# Patient Record
Sex: MALE | Race: WHITE | NOT HISPANIC OR LATINO
[De-identification: names, ages, dates, MRNs, and addresses within clinical notes are randomized per-mention and may not be internally consistent; named-entity substitution may affect disease eponyms.]

---

## 2017-04-10 ENCOUNTER — CHARTING TRANS (OUTPATIENT)
Dept: OTHER | Age: 47
End: 2017-04-10

## 2017-04-10 ENCOUNTER — LAB SERVICES (OUTPATIENT)
Dept: OTHER | Age: 47
End: 2017-04-10

## 2017-04-11 ENCOUNTER — CHARTING TRANS (OUTPATIENT)
Dept: OTHER | Age: 47
End: 2017-04-11

## 2017-04-11 LAB
CREATININE RANDOM URINE: 63.6 MG/DL
MICROALBUMIN UR-MCNC: <0.5 MG/DL
MICROALBUMIN/CREAT UR: NORMAL MCG/MG

## 2017-12-20 ENCOUNTER — CHARTING TRANS (OUTPATIENT)
Dept: OTHER | Age: 47
End: 2017-12-20

## 2018-07-09 ENCOUNTER — CHARTING TRANS (OUTPATIENT)
Dept: OTHER | Age: 48
End: 2018-07-09

## 2018-10-31 VITALS
HEART RATE: 71 BPM | DIASTOLIC BLOOD PRESSURE: 78 MMHG | WEIGHT: 312 LBS | TEMPERATURE: 96.8 F | SYSTOLIC BLOOD PRESSURE: 151 MMHG | BODY MASS INDEX: 41.35 KG/M2 | HEIGHT: 73 IN

## 2018-11-02 VITALS
SYSTOLIC BLOOD PRESSURE: 123 MMHG | HEIGHT: 73 IN | BODY MASS INDEX: 40.69 KG/M2 | HEART RATE: 100 BPM | TEMPERATURE: 97.2 F | DIASTOLIC BLOOD PRESSURE: 76 MMHG | WEIGHT: 307 LBS

## 2018-11-04 VITALS
SYSTOLIC BLOOD PRESSURE: 136 MMHG | HEIGHT: 73 IN | DIASTOLIC BLOOD PRESSURE: 81 MMHG | BODY MASS INDEX: 39.23 KG/M2 | HEART RATE: 82 BPM | TEMPERATURE: 96.7 F | WEIGHT: 296 LBS

## 2018-12-02 DIAGNOSIS — E78.00 HYPERCHOLESTEREMIA: Primary | ICD-10-CM

## 2018-12-04 RX ORDER — ASPIRIN 81 MG/1
TABLET ORAL DAILY
COMMUNITY

## 2018-12-04 RX ORDER — SYRINGE-NEEDLE,INSULIN,0.5 ML 27GX1/2"
SYRINGE, EMPTY DISPOSABLE MISCELLANEOUS
COMMUNITY
Start: 2018-06-24 | End: 2019-06-24

## 2018-12-04 RX ORDER — ATORVASTATIN CALCIUM 10 MG/1
TABLET, FILM COATED ORAL
COMMUNITY
Start: 2018-07-13 | End: 2019-01-14 | Stop reason: SDUPTHER

## 2018-12-04 RX ORDER — ATORVASTATIN CALCIUM 10 MG/1
TABLET, FILM COATED ORAL
Qty: 90 TABLET | Refills: 0 | Status: SHIPPED | OUTPATIENT
Start: 2018-12-04 | End: 2019-03-02 | Stop reason: SDUPTHER

## 2018-12-04 RX ORDER — INSULIN ASPART 100 [IU]/ML
INJECTION, SOLUTION INTRAVENOUS; SUBCUTANEOUS
COMMUNITY
Start: 2018-06-13 | End: 2019-06-13

## 2018-12-17 ENCOUNTER — TELEPHONE (OUTPATIENT)
Dept: ENDOCRINOLOGY | Age: 48
End: 2018-12-17

## 2018-12-17 RX ORDER — LOSARTAN POTASSIUM AND HYDROCHLOROTHIAZIDE 25; 100 MG/1; MG/1
1 TABLET ORAL DAILY
COMMUNITY
End: 2018-12-17 | Stop reason: SDUPTHER

## 2018-12-17 RX ORDER — LOSARTAN POTASSIUM AND HYDROCHLOROTHIAZIDE 25; 100 MG/1; MG/1
1 TABLET ORAL DAILY
Qty: 90 TABLET | Refills: 1 | Status: SHIPPED | OUTPATIENT
Start: 2018-12-17 | End: 2019-06-18 | Stop reason: SDUPTHER

## 2019-01-01 ENCOUNTER — EXTERNAL RECORD (OUTPATIENT)
Dept: HEALTH INFORMATION MANAGEMENT | Facility: OTHER | Age: 49
End: 2019-01-01

## 2019-01-07 RX ORDER — PANTOPRAZOLE SODIUM 40 MG/1
TABLET, DELAYED RELEASE ORAL
COMMUNITY

## 2019-01-14 ENCOUNTER — OFFICE VISIT (OUTPATIENT)
Dept: ENDOCRINOLOGY | Age: 49
End: 2019-01-14

## 2019-01-14 ENCOUNTER — LAB SERVICES (OUTPATIENT)
Dept: LAB | Age: 49
End: 2019-01-14

## 2019-01-14 VITALS
SYSTOLIC BLOOD PRESSURE: 121 MMHG | TEMPERATURE: 97.6 F | HEIGHT: 73 IN | DIASTOLIC BLOOD PRESSURE: 83 MMHG | HEART RATE: 82 BPM | BODY MASS INDEX: 41.75 KG/M2 | WEIGHT: 315 LBS

## 2019-01-14 DIAGNOSIS — E10.65 UNCONTROLLED TYPE 1 DIABETES MELLITUS WITH HYPERGLYCEMIA (CMD): Primary | ICD-10-CM

## 2019-01-14 DIAGNOSIS — E10.65 UNCONTROLLED TYPE 1 DIABETES MELLITUS WITH HYPERGLYCEMIA (CMD): ICD-10-CM

## 2019-01-14 DIAGNOSIS — I10 ESSENTIAL HYPERTENSION: ICD-10-CM

## 2019-01-14 DIAGNOSIS — E78.00 HYPERCHOLESTEROLEMIA: ICD-10-CM

## 2019-01-14 LAB
ALBUMIN SERPL-MCNC: 3.9 G/DL (ref 3.6–5.1)
ALBUMIN/GLOB SERPL: 1.2 {RATIO} (ref 1–2.4)
ALP SERPL-CCNC: 110 UNITS/L (ref 45–117)
ALT SERPL-CCNC: 37 UNITS/L
ANION GAP SERPL CALC-SCNC: 13 MMOL/L (ref 10–20)
AST SERPL-CCNC: 23 UNITS/L
BILIRUB SERPL-MCNC: 0.6 MG/DL (ref 0.2–1)
BUN SERPL-MCNC: 15 MG/DL (ref 6–20)
BUN/CREAT SERPL: 18 (ref 7–25)
CALCIUM SERPL-MCNC: 9.4 MG/DL (ref 8.4–10.2)
CHLORIDE SERPL-SCNC: 106 MMOL/L (ref 98–107)
CHOLEST SERPL-MCNC: 162 MG/DL
CHOLEST/HDLC SERPL: 2.9 {RATIO}
CO2 SERPL-SCNC: 27 MMOL/L (ref 21–32)
CREAT SERPL-MCNC: 0.85 MG/DL (ref 0.67–1.17)
CREAT UR-MCNC: 116 MG/DL
FASTING STATUS PATIENT QL REPORTED: ABNORMAL HRS
GLOBULIN SER-MCNC: 3.2 G/DL (ref 2–4)
GLUCOSE SERPL-MCNC: 133 MG/DL (ref 65–99)
HBA1C MFR BLD: 7.6 % (ref 4.5–5.6)
HDLC SERPL-MCNC: 56 MG/DL
LDLC SERPL-MCNC: 89 MG/DL
LENGTH OF FAST TIME PATIENT: NORMAL HRS
MICROALBUMIN UR-MCNC: 0.96 MG/DL
MICROALBUMIN/CREAT UR: 8.3 MG/G
NONHDLC SERPL-MCNC: 106 MG/DL
POTASSIUM SERPL-SCNC: 3.9 MMOL/L (ref 3.4–5.1)
PROT SERPL-MCNC: 7.1 G/DL (ref 6.4–8.2)
SODIUM SERPL-SCNC: 142 MMOL/L (ref 135–145)
TRIGL SERPL-MCNC: 84 MG/DL

## 2019-01-14 PROCEDURE — 83036 HEMOGLOBIN GLYCOSYLATED A1C: CPT | Performed by: INTERNAL MEDICINE

## 2019-01-14 PROCEDURE — 3074F SYST BP LT 130 MM HG: CPT | Performed by: INTERNAL MEDICINE

## 2019-01-14 PROCEDURE — 82043 UR ALBUMIN QUANTITATIVE: CPT | Performed by: INTERNAL MEDICINE

## 2019-01-14 PROCEDURE — 99214 OFFICE O/P EST MOD 30 MIN: CPT | Performed by: INTERNAL MEDICINE

## 2019-01-14 PROCEDURE — 36415 COLL VENOUS BLD VENIPUNCTURE: CPT | Performed by: INTERNAL MEDICINE

## 2019-01-14 PROCEDURE — 80061 LIPID PANEL: CPT | Performed by: INTERNAL MEDICINE

## 2019-01-14 PROCEDURE — 3079F DIAST BP 80-89 MM HG: CPT | Performed by: INTERNAL MEDICINE

## 2019-01-14 PROCEDURE — 80053 COMPREHEN METABOLIC PANEL: CPT | Performed by: INTERNAL MEDICINE

## 2019-01-14 RX ORDER — BLOOD-GLUCOSE METER
1 EACH MISCELLANEOUS ONCE
Qty: 1 KIT | Refills: 0 | Status: SHIPPED | OUTPATIENT
Start: 2019-01-14 | End: 2019-01-14

## 2019-01-14 RX ORDER — LANCETS
EACH MISCELLANEOUS
Qty: 500 EACH | Refills: 3 | Status: SHIPPED | OUTPATIENT
Start: 2019-01-14 | End: 2020-01-14

## 2019-01-14 RX ORDER — ATORVASTATIN CALCIUM 10 MG/1
TABLET, FILM COATED ORAL
COMMUNITY
Start: 2018-09-08 | End: 2019-01-14 | Stop reason: SDUPTHER

## 2019-01-14 ASSESSMENT — PATIENT HEALTH QUESTIONNAIRE - PHQ9
SUM OF ALL RESPONSES TO PHQ9 QUESTIONS 1 AND 2: 0
2. FEELING DOWN, DEPRESSED OR HOPELESS: NOT AT ALL
1. LITTLE INTEREST OR PLEASURE IN DOING THINGS: NOT AT ALL

## 2019-03-02 DIAGNOSIS — E78.00 HYPERCHOLESTEREMIA: ICD-10-CM

## 2019-03-04 RX ORDER — ATORVASTATIN CALCIUM 10 MG/1
TABLET, FILM COATED ORAL
Qty: 90 TABLET | Refills: 1 | Status: SHIPPED | OUTPATIENT
Start: 2019-03-04 | End: 2019-07-15 | Stop reason: DRUGHIGH

## 2019-06-04 DIAGNOSIS — E78.00 HYPERCHOLESTEREMIA: ICD-10-CM

## 2019-06-04 RX ORDER — ATORVASTATIN CALCIUM 10 MG/1
TABLET, FILM COATED ORAL
Qty: 90 TABLET | Refills: 0 | OUTPATIENT
Start: 2019-06-04

## 2019-06-18 RX ORDER — LOSARTAN POTASSIUM AND HYDROCHLOROTHIAZIDE 25; 100 MG/1; MG/1
1 TABLET ORAL DAILY
Qty: 90 TABLET | Refills: 3 | Status: SHIPPED | OUTPATIENT
Start: 2019-06-18 | End: 2019-06-25

## 2019-06-25 ENCOUNTER — TELEPHONE (OUTPATIENT)
Dept: SCHEDULING | Age: 49
End: 2019-06-25

## 2019-06-25 DIAGNOSIS — E10.65 UNCONTROLLED TYPE 1 DIABETES MELLITUS WITH HYPERGLYCEMIA (CMD): Primary | ICD-10-CM

## 2019-06-25 DIAGNOSIS — I10 ESSENTIAL HYPERTENSION: Primary | ICD-10-CM

## 2019-06-25 RX ORDER — HYDROCHLOROTHIAZIDE 25 MG/1
25 TABLET ORAL DAILY
Qty: 90 TABLET | Refills: 3 | Status: SHIPPED | OUTPATIENT
Start: 2019-06-25

## 2019-06-25 RX ORDER — LOSARTAN POTASSIUM 100 MG/1
100 TABLET ORAL DAILY
Qty: 90 TABLET | Refills: 3 | Status: SHIPPED | OUTPATIENT
Start: 2019-06-25

## 2019-06-28 ENCOUNTER — TELEPHONE (OUTPATIENT)
Dept: SCHEDULING | Age: 49
End: 2019-06-28

## 2019-07-08 DIAGNOSIS — E10.65 UNCONTROLLED TYPE 1 DIABETES MELLITUS WITH HYPERGLYCEMIA (CMD): ICD-10-CM

## 2019-07-08 RX ORDER — INSULIN ASPART 100 [IU]/ML
INJECTION, SOLUTION INTRAVENOUS; SUBCUTANEOUS
Qty: 90 ML | Refills: 3 | Status: SHIPPED | OUTPATIENT
Start: 2019-07-08

## 2019-07-10 DIAGNOSIS — E10.65 UNCONTROLLED TYPE 1 DIABETES MELLITUS WITH HYPERGLYCEMIA (CMD): Primary | ICD-10-CM

## 2019-07-10 RX ORDER — INSULIN GLARGINE 100 [IU]/ML
INJECTION, SOLUTION SUBCUTANEOUS
Qty: 45 ML | Refills: 3 | Status: SHIPPED | OUTPATIENT
Start: 2019-07-10

## 2019-07-15 ENCOUNTER — OFFICE VISIT (OUTPATIENT)
Dept: ENDOCRINOLOGY | Age: 49
End: 2019-07-15

## 2019-07-15 VITALS
SYSTOLIC BLOOD PRESSURE: 117 MMHG | BODY MASS INDEX: 41.75 KG/M2 | DIASTOLIC BLOOD PRESSURE: 62 MMHG | HEART RATE: 71 BPM | HEIGHT: 73 IN | TEMPERATURE: 97.6 F | WEIGHT: 315 LBS

## 2019-07-15 DIAGNOSIS — I10 ESSENTIAL HYPERTENSION: ICD-10-CM

## 2019-07-15 DIAGNOSIS — E78.00 HYPERCHOLESTEROLEMIA: ICD-10-CM

## 2019-07-15 DIAGNOSIS — E10.65 UNCONTROLLED TYPE 1 DIABETES MELLITUS WITH HYPERGLYCEMIA (CMD): Primary | ICD-10-CM

## 2019-07-15 PROCEDURE — 99214 OFFICE O/P EST MOD 30 MIN: CPT | Performed by: INTERNAL MEDICINE

## 2019-07-15 PROCEDURE — 3074F SYST BP LT 130 MM HG: CPT | Performed by: INTERNAL MEDICINE

## 2019-07-15 PROCEDURE — 3078F DIAST BP <80 MM HG: CPT | Performed by: INTERNAL MEDICINE

## 2019-07-15 RX ORDER — ATORVASTATIN CALCIUM 20 MG/1
20 TABLET, FILM COATED ORAL DAILY
Qty: 90 TABLET | Refills: 3 | Status: SHIPPED | OUTPATIENT
Start: 2019-07-15

## 2019-09-02 DIAGNOSIS — E78.00 HYPERCHOLESTEREMIA: ICD-10-CM

## 2019-09-04 RX ORDER — ATORVASTATIN CALCIUM 10 MG/1
TABLET, FILM COATED ORAL
Qty: 90 TABLET | Refills: 3 | Status: SHIPPED | OUTPATIENT
Start: 2019-09-04

## 2019-10-07 DIAGNOSIS — E10.65 UNCONTROLLED TYPE 1 DIABETES MELLITUS WITH HYPERGLYCEMIA (CMD): Primary | ICD-10-CM

## 2019-12-26 LAB — GLUCOSE BLDC GLUCOMTR-MCNC: 270 MG/DL (ref 70–99)

## 2019-12-30 LAB — PATHOLOGIST NAME: NORMAL

## 2020-01-01 ENCOUNTER — EXTERNAL RECORD (OUTPATIENT)
Dept: HEALTH INFORMATION MANAGEMENT | Facility: OTHER | Age: 50
End: 2020-01-01

## 2020-01-16 ENCOUNTER — TELEPHONE (OUTPATIENT)
Dept: SCHEDULING | Age: 50
End: 2020-01-16

## 2020-06-05 LAB — RETINOPATHY PRESENT (RTP): YES

## 2021-02-02 DIAGNOSIS — E10.65 UNCONTROLLED TYPE 1 DIABETES MELLITUS WITH HYPERGLYCEMIA (CMD): ICD-10-CM

## 2021-06-29 ENCOUNTER — EXTERNAL RECORD (OUTPATIENT)
Dept: HEALTH INFORMATION MANAGEMENT | Facility: OTHER | Age: 51
End: 2021-06-29

## 2021-06-29 LAB — RETINOPATHY PRESENT (RTP): YES

## 2022-01-25 ENCOUNTER — EXTERNAL RECORD (OUTPATIENT)
Dept: HEALTH INFORMATION MANAGEMENT | Facility: OTHER | Age: 52
End: 2022-01-25

## 2024-01-23 ENCOUNTER — TELEPHONE (OUTPATIENT)
Dept: ORTHOPEDICS CLINIC | Facility: CLINIC | Age: 54
End: 2024-01-23

## 2024-01-23 DIAGNOSIS — M25.571 RIGHT ANKLE PAIN, UNSPECIFIED CHRONICITY: Primary | ICD-10-CM

## 2024-01-23 NOTE — TELEPHONE ENCOUNTER
Pt coming in for a rt achilles rupture. No imaging in epic. Please advise if xrays are needed.     Thanks!     Future Appointments   Date Time Provider Department Center   1/25/2024 10:40 AM [facility] Little [id number]

## 2024-01-25 ENCOUNTER — HOSPITAL ENCOUNTER (OUTPATIENT)
Dept: GENERAL RADIOLOGY | Age: 54
Discharge: HOME | End: 2024-01-25
Attending: ORTHOPAEDIC SURGERY
Payer: COMMERCIAL

## 2024-01-25 ENCOUNTER — TELEPHONE (OUTPATIENT)
Dept: ORTHOPEDICS CLINIC | Facility: CLINIC | Age: 54
End: 2024-01-25

## 2024-01-25 ENCOUNTER — OFFICE VISIT (OUTPATIENT)
Dept: ORTHOPEDICS CLINIC | Facility: CLINIC | Age: 54
End: 2024-01-25
Payer: COMMERCIAL

## 2024-01-25 VITALS — HEIGHT: 73 IN | BODY MASS INDEX: 41.75 KG/M2 | WEIGHT: 315 LBS

## 2024-01-25 DIAGNOSIS — M25.571 RIGHT ANKLE PAIN, UNSPECIFIED CHRONICITY: ICD-10-CM

## 2024-01-25 DIAGNOSIS — S86.011A RUPTURE OF RIGHT ACHILLES TENDON, INITIAL ENCOUNTER: Primary | ICD-10-CM

## 2024-01-25 PROCEDURE — 99205 OFFICE O/P NEW HI 60 MIN: CPT | Performed by: ORTHOPAEDIC SURGERY

## 2024-01-25 PROCEDURE — 73610 X-RAY EXAM OF ANKLE: CPT | Performed by: ORTHOPAEDIC SURGERY

## 2024-01-25 PROCEDURE — 3008F BODY MASS INDEX DOCD: CPT | Performed by: ORTHOPAEDIC SURGERY

## 2024-01-25 RX ORDER — FAMOTIDINE 40 MG/1
40 TABLET, FILM COATED ORAL DAILY
COMMUNITY

## 2024-01-25 RX ORDER — AZELASTINE 1 MG/ML
1 SPRAY, METERED NASAL AS NEEDED
COMMUNITY

## 2024-01-25 RX ORDER — ROSUVASTATIN CALCIUM 20 MG/1
20 TABLET, COATED ORAL NIGHTLY
COMMUNITY

## 2024-01-25 RX ORDER — OMEPRAZOLE 20 MG/1
40 CAPSULE, DELAYED RELEASE ORAL
COMMUNITY

## 2024-01-25 RX ORDER — DICLOFENAC SODIUM 75 MG/1
75 TABLET, DELAYED RELEASE ORAL AS NEEDED
COMMUNITY

## 2024-01-25 NOTE — PROGRESS NOTES
[facility] New Patient Note    Chief Complaint   Patient presents with    Ankle Injury     RT ACHILLES RUPTURE; DOI: 01/22/2024       HPI: The patient is a 53 year old male with history of type 1 diabetes who presents with complaints of acute posterior right ankle pain and swelling.  He was ascending the stairs on 1/22/2024 when he felt a pop at the posterior aspect of his right ankle.  There were some preceding symptoms of Achilles tendinitis treated conservatively last fall with resolution of symptoms.  He presented to a local urgent care where he was diagnosed with an Achilles rupture and provided a cam boot.  He has been weightbearing as tolerated in the boot.  He denies any underlying neuropathy from his history of diabetes.  Pain and swelling are slowly improving.      History reviewed. No pertinent past medical history.  History reviewed. No pertinent surgical history.  Current Outpatient Medications   Medication Sig Dispense Refill    rosuvastatin 20 MG Oral Tab Take 1 tablet (20 mg total) by mouth nightly.      famotidine 40 MG Oral Tab Take 1 tablet (40 mg total) by mouth daily.      omeprazole 20 MG Oral Capsule Delayed Release Take 2 capsules (40 mg total) by mouth every morning before breakfast. EVERY DAY BEFORE DINNER      azelastine 0.1 % Nasal Solution 1 spray by Nasal route as needed for Rhinitis.      diclofenac 75 MG Oral Tab EC Take 1 tablet (75 mg total) by mouth as needed (NOT MORE THAN ONCE EVERY 12 HOURS).      atorvastatin 20 MG Oral Tab TK 1 T PO D  3    losartan 100 MG Oral Tab TK 1 T PO D  2    Pantoprazole Sodium 40 MG Oral Tab EC TAKE 1 TABLET DAILY      Insulin Aspart (NOVOLOG SC) Inject into the skin.      hydrochlorothiazide 25 MG Oral Tab Take 1 tablet (25 mg total) by mouth daily.      VITAMIN E OR Take by mouth.      B Complex Vitamins (VITAMIN B COMPLEX OR) Inject as directed.      Ascorbic Acid (PETE-C OR) Take by mouth.      VITAMIN E OR       BASAGLAR KWIKPEN 100  UNIT/ML Subcutaneous Solution Pen-injector INJECT 38 UNITS SQ QHS (Patient not taking: Reported on 1/25/2024)  2    Losartan Potassium-HCTZ 100-25 MG Oral Tab  (Patient not taking: Reported on 1/25/2024)       Not on File  History reviewed. No pertinent family history.  Social History     Occupational History    Not on file   Tobacco Use    Smoking status: Never    Smokeless tobacco: Never   Vaping Use    Vaping Use: Never used   Substance and Sexual Activity    Alcohol use: Never    Drug use: Never    Sexual activity: Not on file        ROS:  Complete ROS reviewed by me and non-contributory to the chief complaint except as mentioned above.    Physical Exam:    Ht 6' 1\" (1.854 m)   Wt (!) 348 lb (157.9 kg)   BMI 45.91 kg/m²   Constitutional: Well developed, well nourished 53 year old male  Psychological: NAD, alert and appropriate  Respiratory: Breathing comfortably on room air with RR of 10-14  Cardiac: Palpable distal pulses with pink warm extremities  Cam boot is removed from the right foot and ankle.  There is soft tissue swelling and developing ecchymosis posteriorly at the ankle.  Isaacs test is positive for an Achilles rupture and there may be a palpable defect 3 to 4 cm from the tuberosity insertion.  Medial calf is mildly tender.  He can actively dorsiflex to neutral and plantar flex 10 to 15 degrees.  Neurovascular status is intact on sensory, motor and perfusion assessment distally.    Imaging: Multiple views right ankle personally viewed, demonstrating no fracture dislocation or bony abnormality.  XR ANKLE (MIN 3 VIEWS), RIGHT (CPT=73610)    Result Date: 1/25/2024  CONCLUSION:  See above.   LOCATION:  [place]   Dictated by (CST): Dylan Portillo MD on 1/25/2024 at 10:54 AM     Finalized by (CST): Dylna Portillo MD on 1/25/2024 at 10:54 AM          Assessment/Diagnoses:  Diagnoses and all orders for this visit:    Rupture of right Achilles tendon, initial encounter  -     MRI ANKLE, RIGHT (CPT=73721); Future  -      DME - External      Plan:  I reviewed imaging and exam findings with the patient.  Presents with a history and exam consistent with an acute Achilles tendon rupture.  In light of his medical comorbidities including morbid obesity, type 1 diabetes and history of preceding Achilles symptoms, I would like to obtain an MRI to further characterize the nature of his rupture.  It is possible it may be avulsed from the calcaneus versus mid substance.  The quality of the tendon will also be better assessed.  We touched upon the diagnosis and his treatment options which includes conservative management with casting versus operative repair.  Given his relative youth and activity level he is leaning toward repair in hopes of minimizing rerupture rates and a normalizing function and strength.  For now, we will order a stat MRI of the right ankle for preoperative evaluation and continue to mobilize him in the cam boot with a supplemental heel wedge.  I also advised that he use crutches for protected weightbearing and balance.  We touched upon the nature of Achilles repair which will require general anesthesia.  He has undergone surgical management of hand issues in the past.  Nonetheless with his medical comorbidities there is always a elevated risk of infection and a type I diabetic.  Risk, benefits and alternatives to surgery include but are not limited to possible infection, bleeding, neurovascular injury as well as postop stiffness, rupture, continued pain and failed improvement following surgery.  Risks of anesthesia include but are not limited to possible cardiac, pulmonary, or cerebrovascular complications, any of which could be life-threatening.  Although he is at elevated risk due to his medical comorbidities, he states that he has tolerated anesthesia in the past without complication.  Pre-operative medical clearance for anesthesia will be required.  If he elects to proceed with surgical repair, the procedure will  include a right Achilles tendon repair under anesthesia.  The patient verbalized understanding and for now, we will order a stat MRI for presurgical evaluation of the zone of injury, provide a heel wedge to protect the Achilles within the boot as well as crutches for assisted with gait and for fall prevention.  We will be in touch once the MRI becomes available.    Ester Little MD  [facility]      This document was partially prepared using Dragon Medical voice recognition software.

## 2024-01-25 NOTE — TELEPHONE ENCOUNTER
Called and spoke with Jayy in regards to getting him scheduled for surgery. I did let him know that I did speak with Dr. Little to let him know that he was able to get in for an appt with his pcp on 1/30/24. I also informed him that Dr. Little would like for him to proceed with surgery on 2/2/24. He has opted to proceed with surgery on this day. I did confirm that his surgery will take place at [facility]. I also discussed the surgical protocol with him as well as the post op appt date and time. He states understanding with no questions or concerns.

## 2024-01-25 NOTE — TELEPHONE ENCOUNTER
Date of Surgery: 2/2/24        Post Op Appt:  2/15/24 @ 0140    Case ID: [id number]    Notes:       SURGERY SCHEDULING SHEET     Jayy Jalloh   6/6/1970   [id number]     Procedure: Right Achilles Tendon Repair ([id number])     Diagnosis: Right Achilles tendon rupture     Anesthesia: General     Length of Surgery: 1.5 hrs     Disposition: Outpatient     Special Equipment: Arthrex, Michael Frame     Positioning:  Prone     Assist: Yes SK     Pre-op Testing: PER ANESTHESIA GUIDELINES     Clearance: HISTORY AND PHYSICAL     Post op: 2 weeks post op     Ester Little MD   [facility]   Phone: [phone]   Fax: [phone]

## 2024-01-26 ENCOUNTER — HOSPITAL ENCOUNTER (OUTPATIENT)
Dept: MRI IMAGING | Facility: HOSPITAL | Age: 54
Discharge: HOME | End: 2024-01-26
Attending: ORTHOPAEDIC SURGERY
Payer: COMMERCIAL

## 2024-01-26 DIAGNOSIS — S86.011A RUPTURE OF RIGHT ACHILLES TENDON, INITIAL ENCOUNTER: ICD-10-CM

## 2024-01-26 PROCEDURE — 73721 MRI JNT OF LWR EXTRE W/O DYE: CPT | Performed by: ORTHOPAEDIC SURGERY

## 2024-01-26 RX ORDER — MONTELUKAST SODIUM 10 MG/1
10 TABLET ORAL NIGHTLY
COMMUNITY

## 2024-01-26 RX ORDER — ASPIRIN 81 MG/1
81 TABLET ORAL DAILY
COMMUNITY

## 2024-02-02 ENCOUNTER — HOSPITAL ENCOUNTER (OUTPATIENT)
Facility: HOSPITAL | Age: 54
Setting detail: HOSPITAL OUTPATIENT SURGERY
Discharge: HOME | End: 2024-02-02
Attending: ORTHOPAEDIC SURGERY | Admitting: ORTHOPAEDIC SURGERY
Payer: COMMERCIAL

## 2024-02-02 ENCOUNTER — ANESTHESIA EVENT (OUTPATIENT)
Dept: SURGERY | Facility: HOSPITAL | Age: 54
End: 2024-02-02
Payer: COMMERCIAL

## 2024-02-02 ENCOUNTER — ANESTHESIA (OUTPATIENT)
Dept: SURGERY | Facility: HOSPITAL | Age: 54
End: 2024-02-02
Payer: COMMERCIAL

## 2024-02-02 VITALS
HEART RATE: 72 BPM | HEIGHT: 73 IN | TEMPERATURE: 98 F | RESPIRATION RATE: 18 BRPM | WEIGHT: 315 LBS | OXYGEN SATURATION: 100 % | DIASTOLIC BLOOD PRESSURE: 59 MMHG | SYSTOLIC BLOOD PRESSURE: 143 MMHG | BODY MASS INDEX: 41.75 KG/M2

## 2024-02-02 DIAGNOSIS — S86.011D RUPTURE OF RIGHT ACHILLES TENDON, SUBSEQUENT ENCOUNTER: Primary | ICD-10-CM

## 2024-02-02 LAB
GLUCOSE BLD-MCNC: 113 MG/DL (ref 70–99)
GLUCOSE BLD-MCNC: 242 MG/DL (ref 70–99)

## 2024-02-02 PROCEDURE — 82962 GLUCOSE BLOOD TEST: CPT

## 2024-02-02 PROCEDURE — 0LQN0ZZ REPAIR RIGHT LOWER LEG TENDON, OPEN APPROACH: ICD-10-PCS | Performed by: ORTHOPAEDIC SURGERY

## 2024-02-02 RX ORDER — NICOTINE POLACRILEX 4 MG
30 LOZENGE BUCCAL
Status: DISCONTINUED | OUTPATIENT
Start: 2024-02-02 | End: 2024-02-02 | Stop reason: HOSPADM

## 2024-02-02 RX ORDER — LABETALOL HYDROCHLORIDE 5 MG/ML
5 INJECTION, SOLUTION INTRAVENOUS EVERY 5 MIN PRN
Status: DISCONTINUED | OUTPATIENT
Start: 2024-02-02 | End: 2024-02-02

## 2024-02-02 RX ORDER — ACETAMINOPHEN 500 MG
1000 TABLET ORAL ONCE
Status: DISCONTINUED | OUTPATIENT
Start: 2024-02-02 | End: 2024-02-02 | Stop reason: HOSPADM

## 2024-02-02 RX ORDER — HYDROMORPHONE HYDROCHLORIDE 1 MG/ML
0.6 INJECTION, SOLUTION INTRAMUSCULAR; INTRAVENOUS; SUBCUTANEOUS EVERY 5 MIN PRN
Status: DISCONTINUED | OUTPATIENT
Start: 2024-02-02 | End: 2024-02-02

## 2024-02-02 RX ORDER — HYDROCODONE BITARTRATE AND ACETAMINOPHEN 5; 325 MG/1; MG/1
1-2 TABLET ORAL EVERY 6 HOURS PRN
Qty: 24 TABLET | Refills: 0 | Status: SHIPPED | OUTPATIENT
Start: 2024-02-02

## 2024-02-02 RX ORDER — BUPIVACAINE HYDROCHLORIDE 5 MG/ML
INJECTION, SOLUTION EPIDURAL; INTRACAUDAL AS NEEDED
Status: DISCONTINUED | OUTPATIENT
Start: 2024-02-02 | End: 2024-02-02 | Stop reason: HOSPADM

## 2024-02-02 RX ORDER — ROCURONIUM BROMIDE 10 MG/ML
INJECTION, SOLUTION INTRAVENOUS AS NEEDED
Status: DISCONTINUED | OUTPATIENT
Start: 2024-02-02 | End: 2024-02-02 | Stop reason: SURG

## 2024-02-02 RX ORDER — NICOTINE POLACRILEX 4 MG
15 LOZENGE BUCCAL
Status: DISCONTINUED | OUTPATIENT
Start: 2024-02-02 | End: 2024-02-02 | Stop reason: HOSPADM

## 2024-02-02 RX ORDER — HYDROCODONE BITARTRATE AND ACETAMINOPHEN 5; 325 MG/1; MG/1
1 TABLET ORAL EVERY 6 HOURS PRN
Status: CANCELLED | OUTPATIENT
Start: 2024-02-02

## 2024-02-02 RX ORDER — LIDOCAINE HYDROCHLORIDE 10 MG/ML
INJECTION, SOLUTION INFILTRATION; PERINEURAL AS NEEDED
Status: DISCONTINUED | OUTPATIENT
Start: 2024-02-02 | End: 2024-02-02 | Stop reason: HOSPADM

## 2024-02-02 RX ORDER — DEXTROSE MONOHYDRATE 25 G/50ML
50 INJECTION, SOLUTION INTRAVENOUS
Status: DISCONTINUED | OUTPATIENT
Start: 2024-02-02 | End: 2024-02-02 | Stop reason: HOSPADM

## 2024-02-02 RX ORDER — HYDROMORPHONE HYDROCHLORIDE 1 MG/ML
0.4 INJECTION, SOLUTION INTRAMUSCULAR; INTRAVENOUS; SUBCUTANEOUS EVERY 5 MIN PRN
Status: DISCONTINUED | OUTPATIENT
Start: 2024-02-02 | End: 2024-02-02

## 2024-02-02 RX ORDER — PROCHLORPERAZINE EDISYLATE 5 MG/ML
5 INJECTION INTRAMUSCULAR; INTRAVENOUS EVERY 8 HOURS PRN
Status: DISCONTINUED | OUTPATIENT
Start: 2024-02-02 | End: 2024-02-02

## 2024-02-02 RX ORDER — HYDROCODONE BITARTRATE AND ACETAMINOPHEN 5; 325 MG/1; MG/1
1 TABLET ORAL EVERY 6 HOURS PRN
Status: DISCONTINUED | OUTPATIENT
Start: 2024-02-02 | End: 2024-02-02

## 2024-02-02 RX ORDER — CEFAZOLIN SODIUM IN 0.9 % NACL 3 G/100 ML
3 INTRAVENOUS SOLUTION, PIGGYBACK (ML) INTRAVENOUS ONCE
Status: COMPLETED | OUTPATIENT
Start: 2024-02-02 | End: 2024-02-02

## 2024-02-02 RX ORDER — ONDANSETRON 2 MG/ML
INJECTION INTRAMUSCULAR; INTRAVENOUS AS NEEDED
Status: DISCONTINUED | OUTPATIENT
Start: 2024-02-02 | End: 2024-02-02 | Stop reason: SURG

## 2024-02-02 RX ORDER — KETOROLAC TROMETHAMINE 30 MG/ML
INJECTION, SOLUTION INTRAMUSCULAR; INTRAVENOUS AS NEEDED
Status: DISCONTINUED | OUTPATIENT
Start: 2024-02-02 | End: 2024-02-02 | Stop reason: SURG

## 2024-02-02 RX ORDER — NALOXONE HYDROCHLORIDE 0.4 MG/ML
80 INJECTION, SOLUTION INTRAMUSCULAR; INTRAVENOUS; SUBCUTANEOUS AS NEEDED
Status: DISCONTINUED | OUTPATIENT
Start: 2024-02-02 | End: 2024-02-02

## 2024-02-02 RX ORDER — ACETAMINOPHEN 325 MG/1
650 TABLET ORAL ONCE
Status: DISCONTINUED | OUTPATIENT
Start: 2024-02-02 | End: 2024-02-02

## 2024-02-02 RX ORDER — INSULIN ASPART 100 [IU]/ML
INJECTION, SOLUTION INTRAVENOUS; SUBCUTANEOUS
Status: COMPLETED
Start: 2024-02-02 | End: 2024-02-02

## 2024-02-02 RX ORDER — MEPERIDINE HYDROCHLORIDE 25 MG/ML
12.5 INJECTION INTRAMUSCULAR; INTRAVENOUS; SUBCUTANEOUS AS NEEDED
Status: DISCONTINUED | OUTPATIENT
Start: 2024-02-02 | End: 2024-02-02

## 2024-02-02 RX ORDER — HYDROMORPHONE HYDROCHLORIDE 1 MG/ML
0.2 INJECTION, SOLUTION INTRAMUSCULAR; INTRAVENOUS; SUBCUTANEOUS EVERY 5 MIN PRN
Status: DISCONTINUED | OUTPATIENT
Start: 2024-02-02 | End: 2024-02-02

## 2024-02-02 RX ORDER — ACETAMINOPHEN 500 MG
1000 TABLET ORAL ONCE AS NEEDED
Status: DISCONTINUED | OUTPATIENT
Start: 2024-02-02 | End: 2024-02-02

## 2024-02-02 RX ORDER — HYDROCODONE BITARTRATE AND ACETAMINOPHEN 5; 325 MG/1; MG/1
2 TABLET ORAL ONCE AS NEEDED
Status: DISCONTINUED | OUTPATIENT
Start: 2024-02-02 | End: 2024-02-02

## 2024-02-02 RX ORDER — SODIUM CHLORIDE, SODIUM LACTATE, POTASSIUM CHLORIDE, CALCIUM CHLORIDE 600; 310; 30; 20 MG/100ML; MG/100ML; MG/100ML; MG/100ML
INJECTION, SOLUTION INTRAVENOUS CONTINUOUS
Status: DISCONTINUED | OUTPATIENT
Start: 2024-02-02 | End: 2024-02-02

## 2024-02-02 RX ORDER — ONDANSETRON 2 MG/ML
4 INJECTION INTRAMUSCULAR; INTRAVENOUS EVERY 6 HOURS PRN
Status: DISCONTINUED | OUTPATIENT
Start: 2024-02-02 | End: 2024-02-02

## 2024-02-02 RX ORDER — LIDOCAINE HYDROCHLORIDE 10 MG/ML
INJECTION, SOLUTION EPIDURAL; INFILTRATION; INTRACAUDAL; PERINEURAL AS NEEDED
Status: DISCONTINUED | OUTPATIENT
Start: 2024-02-02 | End: 2024-02-02 | Stop reason: SURG

## 2024-02-02 RX ORDER — SCOLOPAMINE TRANSDERMAL SYSTEM 1 MG/1
1 PATCH, EXTENDED RELEASE TRANSDERMAL ONCE
Status: DISCONTINUED | OUTPATIENT
Start: 2024-02-02 | End: 2024-02-02 | Stop reason: HOSPADM

## 2024-02-02 RX ORDER — INSULIN ASPART 100 [IU]/ML
INJECTION, SOLUTION INTRAVENOUS; SUBCUTANEOUS ONCE
Status: COMPLETED | OUTPATIENT
Start: 2024-02-02 | End: 2024-02-02

## 2024-02-02 RX ORDER — DIPHENHYDRAMINE HYDROCHLORIDE 50 MG/ML
12.5 INJECTION INTRAMUSCULAR; INTRAVENOUS AS NEEDED
Status: DISCONTINUED | OUTPATIENT
Start: 2024-02-02 | End: 2024-02-02

## 2024-02-02 RX ORDER — HYDROCODONE BITARTRATE AND ACETAMINOPHEN 5; 325 MG/1; MG/1
1 TABLET ORAL ONCE AS NEEDED
Status: DISCONTINUED | OUTPATIENT
Start: 2024-02-02 | End: 2024-02-02

## 2024-02-02 RX ADMIN — ROCURONIUM BROMIDE 15 MG: 10 INJECTION, SOLUTION INTRAVENOUS at 15:52:00

## 2024-02-02 RX ADMIN — ONDANSETRON 4 MG: 2 INJECTION INTRAMUSCULAR; INTRAVENOUS at 16:50:00

## 2024-02-02 RX ADMIN — SODIUM CHLORIDE, SODIUM LACTATE, POTASSIUM CHLORIDE, CALCIUM CHLORIDE: 600; 310; 30; 20 INJECTION, SOLUTION INTRAVENOUS at 17:18:00

## 2024-02-02 RX ADMIN — CEFAZOLIN SODIUM IN 0.9 % NACL 3 G: 3 G/100 ML INTRAVENOUS SOLUTION, PIGGYBACK (ML) INTRAVENOUS at 15:27:00

## 2024-02-02 RX ADMIN — SODIUM CHLORIDE, SODIUM LACTATE, POTASSIUM CHLORIDE, CALCIUM CHLORIDE: 600; 310; 30; 20 INJECTION, SOLUTION INTRAVENOUS at 15:18:00

## 2024-02-02 RX ADMIN — SODIUM CHLORIDE, SODIUM LACTATE, POTASSIUM CHLORIDE, CALCIUM CHLORIDE: 600; 310; 30; 20 INJECTION, SOLUTION INTRAVENOUS at 15:46:00

## 2024-02-02 RX ADMIN — ROCURONIUM BROMIDE 40 MG: 10 INJECTION, SOLUTION INTRAVENOUS at 15:26:00

## 2024-02-02 RX ADMIN — ROCURONIUM BROMIDE 10 MG: 10 INJECTION, SOLUTION INTRAVENOUS at 15:25:00

## 2024-02-02 RX ADMIN — LIDOCAINE HYDROCHLORIDE 100 MG: 10 INJECTION, SOLUTION EPIDURAL; INFILTRATION; INTRACAUDAL; PERINEURAL at 15:25:00

## 2024-02-02 RX ADMIN — KETOROLAC TROMETHAMINE 45 MG: 30 INJECTION, SOLUTION INTRAMUSCULAR; INTRAVENOUS at 17:03:00

## 2024-02-02 NOTE — ANESTHESIA PROCEDURE NOTES
Airway  Date/Time: 2/2/2024 3:25 PM  Urgency: elective      General Information and Staff    Patient location during procedure: OR  Anesthesiologist: Juan J Simon MD  Performed: anesthesiologist   Performed by: Juan J Simon MD  Authorized by: Juan J Simon MD      Indications and Patient Condition  Indications for airway management: anesthesia  Spontaneous Ventilation: absent  Sedation level: deep  Preoxygenated: yes  Patient position: sniffing  Mask difficulty assessment: 0 - not attempted    Final Airway Details  Final airway type: endotracheal airway      Successful airway: ETT  Cuffed: yes   Successful intubation technique: Video laryngoscopy  Facilitating devices/methods: intubating stylet  Endotracheal tube insertion site: oral  Blade: GlideScope  Blade size: #4  ETT size (mm): 7.5    Cormack-Lehane Classification: grade I - full view of glottis  Placement verified by: capnometry   Cuff volume (mL): 11  Measured from: lips  ETT to lips (cm): 23  Number of attempts at approach: 1  Number of other approaches attempted: 0

## 2024-02-02 NOTE — ANESTHESIA POSTPROCEDURE EVALUATION
[facility]    Jayy Jalloh Patient Status:  Hospital Outpatient Surgery   Age/Gender 53 year old male MRN [id number]   Location [facility] POST ANESTHESIA CARE UNIT Attending Ester Little MD   Hosp Day # 0 PCP NYDIA SHEPARD       Anesthesia Post-op Note    RIGHT ACHILLES TENDON REPAIR    Procedure Summary       Date: 02/02/24 Room / Location:  MAIN OR  /  MAIN OR    Anesthesia Start: 1518 Anesthesia Stop: 1728    Procedure: RIGHT ACHILLES TENDON REPAIR (Right: Ankle) Diagnosis:       Rupture of right Achilles tendon, initial encounter      (Rupture of right Achilles tendon, initial encounter [S86.011A])    Surgeons: Ester Little MD Anesthesiologist: Juan J Simon MD    Anesthesia Type: general ASA Status: 2            Anesthesia Type: No value filed.    Vitals Value Taken Time   /58 02/02/24 1733   Temp 96.8 °F (36 °C) 02/02/24 1728   Pulse 68 02/02/24 1736   Resp 20 02/02/24 1736   SpO2 96 % 02/02/24 1736   Vitals shown include unfiled device data.    Patient Location: PACU    Anesthesia Type: general    Airway Patency: patent and extubated    Postop Pain Control: adequate    Mental Status: preanesthetic baseline    Nausea/Vomiting: none    Cardiopulmonary/Hydration status: stable euvolemic    Complications: no apparent anesthesia related complications    Postop vital signs: stable    Dental Exam: Unchanged from Preop    Patient to be discharged from PACU when criteria met.

## 2024-02-02 NOTE — ANESTHESIA PREPROCEDURE EVALUATION
PRE-OP EVALUATION    Patient Name: Jayy Jalloh    Admit Diagnosis: Rupture of right Achilles tendon, initial encounter [S86.011A]    Pre-op Diagnosis: Rupture of right Achilles tendon, initial encounter [S86.011A]    RIGHT ACHILLES TENDON REPAIR    Anesthesia Procedure: RIGHT ACHILLES TENDON REPAIR (Right)    Surgeon(s) and Role:     * Ester Little MD - Primary    Pre-op vitals reviewed.  Temp: 98.6 °F (37 °C)  Pulse: 69  Resp: 16  BP: 139/72  SpO2: 100 %  Body mass index is 46.44 kg/m².    Current medications reviewed.  Hospital Medications:   [MAR Hold] acetaminophen (Tylenol Extra Strength) tab 1,000 mg  1,000 mg Oral Once    [MAR Hold] scopolamine (Transderm-Scop) 1 MG/3DAYS patch 1 patch  1 patch Transdermal Once    [MAR Hold] glucose (Dex4) 15 GM/59ML oral liquid 15 g  15 g Oral Q15 Min PRN    Or    [MAR Hold] glucose (Glutose) 40% oral gel 15 g  15 g Oral Q15 Min PRN    Or    [MAR Hold] glucose-vitamin C (Dex-4) chewable tab 4 tablet  4 tablet Oral Q15 Min PRN    Or    [MAR Hold] dextrose 50% injection 50 mL  50 mL Intravenous Q15 Min PRN    Or    [MAR Hold] glucose (Dex4) 15 GM/59ML oral liquid 30 g  30 g Oral Q15 Min PRN    Or    [MAR Hold] glucose (Glutose) 40% oral gel 30 g  30 g Oral Q15 Min PRN    Or    [MAR Hold] glucose-vitamin C (Dex-4) chewable tab 8 tablet  8 tablet Oral Q15 Min PRN    lactated ringers infusion   Intravenous Continuous    [COMPLETED] ceFAZolin (Ancef) 3 g in sodium chloride 0.9% 100mL IVPB premix  3 g Intravenous Once       Outpatient Medications:     Medications Prior to Admission   Medication Sig Dispense Refill Last Dose    aspirin 81 MG Oral Tab EC Take 1 tablet (81 mg total) by mouth daily.   1/25/2024    rosuvastatin 20 MG Oral Tab Take 1 tablet (20 mg total) by mouth nightly.   2/1/2024 at 2100    famotidine 40 MG Oral Tab Take 1 tablet (40 mg total) by mouth daily.   2/1/2024 at 2100    omeprazole 20 MG Oral Capsule Delayed Release Take 2 capsules (40 mg total) by  mouth every morning before breakfast. EVERY DAY BEFORE DINNER   2/1/2024 at 2100    azelastine 0.1 % Nasal Solution 1 spray by Nasal route as needed for Rhinitis.   2/1/2024 at 2100    atorvastatin 20 MG Oral Tab TK 1 T PO D  3 2/1/2024 at 2100    losartan 100 MG Oral Tab TK 1 T PO D  2 2/1/2024 at 2100    Pantoprazole Sodium 40 MG Oral Tab EC TAKE 1 TABLET DAILY       Insulin Aspart (NOVOLOG SC) Inject into the skin.   2/2/2024 at 1245    hydrochlorothiazide 25 MG Oral Tab Take 1 tablet (25 mg total) by mouth daily.   1/26/2024    VITAMIN E OR Take by mouth.   1/25/2024    B Complex Vitamins (VITAMIN B COMPLEX OR) Inject as directed.   1/26/2024    Ascorbic Acid (PETE-C OR) Take by mouth.   1/25/2024    montelukast 10 MG Oral Tab Take 1 tablet (10 mg total) by mouth nightly.   More than a month    diclofenac 75 MG Oral Tab EC Take 1 tablet (75 mg total) by mouth as needed (NOT MORE THAN ONCE EVERY 12 HOURS).   1/24/2024    BASAGLAR KWIKPEN 100 UNIT/ML Subcutaneous Solution Pen-injector INJECT 38 UNITS SQ QHS (Patient not taking: Reported on 1/25/2024)  2     Losartan Potassium-HCTZ 100-25 MG Oral Tab  (Patient not taking: Reported on 1/25/2024)       VITAMIN E OR    1/25/2024       Allergies: Patient has no known allergies.      Anesthesia Evaluation    Patient summary reviewed.    Anesthetic Complications  (-) history of anesthetic complications         GI/Hepatic/Renal      (+) GERD                           Cardiovascular      ECG reviewed.  Exercise tolerance: good     MET: >4    (+) obesity  (+) hypertension       (-) past MI                (-) angina     (-) CASTELLON  (-) orthopnea  (-) PND     Endo/Other      (+) diabetes  type 1, using insulin                         Pulmonary    Negative pulmonary ROS.           (-) shortness of breath  (-) recent URI   (-) sleep apnea       Neuro/Psych    Negative neuro/psych ROS.                                  Past Surgical History:   Procedure Laterality Date     COLONOSCOPY      FRACTURE SURGERY      TONSILLECTOMY      UPPER GI ENDOSCOPY,EXAM       Social History     Socioeconomic History    Marital status:    Tobacco Use    Smoking status: Never    Smokeless tobacco: Never   Vaping Use    Vaping Use: Never used   Substance and Sexual Activity    Alcohol use: Never    Drug use: Never     History   Drug Use Unknown     Available pre-op labs reviewed.               Airway      Mallampati: II  Mouth opening: >3 FB  TM distance: 4 - 6 cm  Neck ROM: limited Cardiovascular    Cardiovascular exam normal.  Rhythm: regular  Rate: normal  (-) murmur   Dental    Dentition appears grossly intact         Pulmonary    Pulmonary exam normal.  Breath sounds clear to auscultation bilaterally.        (-) wheezes       Other findings              ASA: 2   Plan: general  NPO status verified and patient meets guidelines.    Post-procedure pain management plan discussed with surgeon and patient.      Plan/risks discussed with: patient              We discussed GA w/ETT and possible scratchy throat and rarely dental damage.  We discussed analgesic plan and PONV prophylaxis.  We discussed padding in the prone position with optigards, facial foam, and other pressure points padded.  The patient's questions were answered and consent was attained.

## 2024-02-02 NOTE — BRIEF OP NOTE
Pre-Operative Diagnosis: Rupture of right Achilles tendon, initial encounter [S86.011A]     Post-Operative Diagnosis: Rupture of right Achilles tendon, initial encounter [S86.011A]      Procedure Performed:   RIGHT ACHILLES TENDON REPAIR    Surgeon(s) and Role:     * Ester Little MD - Primary    Assistant(s):  Surgical Assistant.: Nahum Mazariegos     Surgical Findings: See post-op     Specimen: none     Estimated Blood Loss: Blood Output: 10 mL (2/2/2024  4:53 PM)    Ester Little MD  2/2/2024  5:23 PM

## 2024-02-02 NOTE — H&P
Orthopaedic H&P Update    H&P performed by Dr. Walter Marcos was reviewed by Ester Little MD on 2/2/2024, the patient was examined and no significant changes have occurred in the patient's condition since the H&P was performed.  Risks and benefits were discussed, proceed with procedure as planned.      Ester Little MD

## 2024-02-03 NOTE — DISCHARGE INSTRUCTIONS
Home Care Instructions  POST-OPERATIVE INSTRUCTIONS  K Little.    1. Rest and minimize your activities.  2. Keep splints, braces, slings, immobilizers and dressings on unless instructed differently.  3. Take pain medication as directed. Start your anti-inflammatory the day after surgery. You must take it after a meal.  4. Keep your surgical dressing dry. The dressing will be removed tomorrow at your post-op appointment.  5. Regularly move your fingers or toes/ankles of the operative extremity. If you have more than mild swelling, call our office [phone].  6. Wear loose fitting clothes if you are scheduled for physical therapy.  7. Please make note of the location of your appointment.

## 2024-02-03 NOTE — OPERATIVE REPORT
[facility]    PATIENT'S NAME: DAKOTA GOMEZ   ATTENDING PHYSICIAN: Ester Little M.D.   OPERATING PHYSICIAN: Ester Little M.D.   PATIENT ACCOUNT#:   [id number]    LOCATION:  PACU  PACU 6 ED 10  MEDICAL RECORD #:   [id number]       YOB: 1970  ADMISSION DATE:       02/02/2024      OPERATION DATE:  02/02/2024    OPERATIVE REPORT      PREOPERATIVE DIAGNOSIS:  Right Achilles tendon rupture.      I POSTOPERATIVE DIAGNOSIS:  Right Achilles tendon rupture.   Although Ash thank you Monday early    PROCEDURE:  Right Achilles tendon repair.    ASSISTANT:  WENDI Plata     ANESTHESIA:  General.    COMPLICATIONS:  None.    DRAINS:  None.      SPECIMENS:  None.      CONDITION:  Stable.    BLOOD LOSS:  10 mL.      TOURNIQUET TIME:  Approximately 80 minutes.    IMPLANTS:  Arthrex 1.3 mm SutureTape x6.    INDICATIONS:  The patient is a 53-year-old male with history of diabetes who sustained an acute injury to his right Achilles on 01/22/2024.  He was ascending the stairs when he felt a pop at the posterior aspect of his right ankle with associated pain and weakness.  He was seen in the office on 01/25/2024 and diagnosed with an acute Achilles tendon rupture.  Treatment recommendations were reviewed.  Given the patient's relative youth, activity level, and a BMI of 47, I suggested that he would be at risk for re-rupture, chronic weakness, and difficulty with push-off strength with nonoperative care.  We, therefore, discussed options for Achilles tendon repair including risks, benefits, and alternatives.  This includes, but is not limited to, possible infection, bleeding, neurovascular injury, postop stiffness, persistent weakness, re-rupture, skin and wound problems, or failed improvement following surgery.  Risks of anesthesia were also reviewed including, but not limited to, possible cardiac, pulmonary, cerebrovascular, or thromboembolic phenomena.  Appropriate preoperative medical  evaluation and clearance was provided through his primary care physician, Dr. Walter Marcos.  Informed consent was obtained.    OPERATIVE TECHNIQUE:  Patient was identified in the preop holding area where the right lower extremity was initialed.  He was taken to the operating room and intubated supine on the transport cart.  We then placed a well-padded proximal thigh tourniquet on the right lower extremity preset to 275 mmHg.  We then carefully placed him in the prone position on a Michael frame with all bony prominences well padded including bilateral axillary and elbow padding.  A sponge face cradle was placed by Anesthesia, as well as protective goggles for the eyes.  Gelfoam pads were placed underneath the knees.  Right lower extremity was then prepped and draped in the usual sterile fashion.    After confirming consent, laterality, prophylactic antibiotics with an appropriate time-out, right lower extremity was exsanguinated with an Esmarch and the tourniquet was elevated.  A 6 cm longitudinal incision was marked, centered over the palpable defect of the right Achilles tendon approximately 3 to 4 cm from the calcaneal tuberosity.  Skin incision was then made through 1 full-thickness flap down to the paratenon.  The underlying paratenon was split in line with the skin incision, exposing a complete rupture in a transverse fashion of the entire Achilles tendon.  The distal stump was mobilized bluntly and initially grasped with a modified Krackow stitch using a 1.3 mm Arthrex SutureTape.  This was placed in the central core of the tendon.  In a similar fashion, the proximal stump was also grasped with a modified Krackow, taking great care to mobilize the myotendinous proximal stump to reapproximate to the distal aspect.  Once adequate core sutures were placed, I selected 2 additional 1.3 mm SutureTapes, and just proximal to the core sutures, placed a locking Jazmín-type transverse grasping suture at the  proximal and distal stumps with the assistance of a long straight Caesar needle.  A second more proximal Jazmín stitch was placed with 2 additional SutureTapes.  This created 6 bridging sutures on each side of the rupture.  In a sequential fashion, working from the most distant sutures from the tear to the core sutures, square knots were tied, initially laterally than medially, for each paired set of sutures.  A total of 6 knots were tied bridging the repair.  I was satisfied with the anatomic reduction of the tear, and the comparison of the resting plantar flexion angle was approximately 25 degrees, similar to the uninjured left side preoperatively.  Isaacs test was restored.  The wound was copiously irrigated and closed in layers.  Meticulous hemostasis was achieved and confirmed with Bovie cautery.  The paratenon was reapproximated with figure-of-eight 3-0 Vicryl working from proximal and distal centrally toward the tear.  We were able to cover the repair completely.  The subdermal layer was reapproximated also with 3-0 Vicryl reapproximating the skin edges to a satisfactory configuration.  I then elected to seal the skin with Dermabond.  A sterile nonadhesive dressing was applied using Adaptic, fluffs, an ABD, and sterile Webril.  He was placed in a well-padded, well-molded short-leg fiberglass cast in about 25 degrees of plantar flexion.  The patient was then flipped supine and awoken without complication.  All sponge, needle, and instrument counts were correct.  He was taken to the postanesthesia recovery room in stable condition.    Dedicated assistance from Nahum Mazariegos [facility], was paramount for retraction, suture management, assistance with wound closure, as well as placement of the dressing and cast.     Dictated By Ester Little M.D.  d: 02/02/2024 17:48:22  t: 02/02/2024 18:19:54  Job [id number]  MARIKA/

## 2024-02-15 ENCOUNTER — OFFICE VISIT (OUTPATIENT)
Dept: ORTHOPEDICS CLINIC | Facility: CLINIC | Age: 54
End: 2024-02-15
Payer: COMMERCIAL

## 2024-02-15 VITALS — BODY MASS INDEX: 41.75 KG/M2 | HEIGHT: 73 IN | WEIGHT: 315 LBS

## 2024-02-15 DIAGNOSIS — Z48.89 AFTERCARE FOLLOWING SURGERY: ICD-10-CM

## 2024-02-15 DIAGNOSIS — S86.011D RUPTURE OF RIGHT ACHILLES TENDON, SUBSEQUENT ENCOUNTER: Primary | ICD-10-CM

## 2024-02-15 NOTE — PROGRESS NOTES
[facility] Post Op Progress Note    Date of Surgery: 02/02/2024      Subjective: Jayy Jalloh is a 53 year old male who is here for follow-up of his right Achilles.  He underwent right Achilles tendon repair by Dr. Little approximately 2 weeks ago.  He has tolerated the short leg cast with no issues.  He does note some abrasions on his left lower extremity due to kicking the left leg with the cast at night.  He states pain is tolerable.  No other complaints.      Objective: Exam of the right lower extremity and foot and ankle after cast removal reveals overlying skin is intact.  The incision is clean, dry, and intact and healing well.  Sensation is present to light touch.  No calf tenderness upon palpation.  He is able to wiggle his digits.      Assessment: Status post right Achilles tendon repair      Plan: Overall he is doing very well.  Due to his history of tendinosis prior to rupture, we do recommend repeat casting for an additional 2 weeks.  This will be another short leg cast to the right lower extremity.  He will follow-up in 2 weeks at which time we will transition him to a cam boot with heel wedges and initiation of physical therapy.  He will bring the boot with him at his next visit.  He understands.  He will follow-up at that time for cast removal.  A work note stating he is unable to return to work due to him walking downtown to work was given.  He will follow-up as mentioned or sooner with questions, concerns, or worsening symptoms.    Procedure: A well-padded, well molded short leg fiberglass cast in plantarflexion to the right lower extremity.  He tolerated the application well and was comfortable after application.  He will follow-up with us with any cast complications.  Cast care was discussed and he will avoid getting the cast wet or putting weight on it.      Zonia Mo, [facility], PA-C Orthopedic Surgery   [facility]  [address] [place] [id number]   t: [phone]   f: [phone]

## 2024-02-19 ENCOUNTER — PATIENT MESSAGE (OUTPATIENT)
Dept: ORTHOPEDICS CLINIC | Facility: CLINIC | Age: 54
End: 2024-02-19

## 2024-02-20 NOTE — TELEPHONE ENCOUNTER
From: Jayy Jalloh  To: Zonia Mo  Sent: 2/19/2024 5:23 PM CST  Subject: Need document filled out.    Dr. Mo  My employer is asking for extra paper work to be filled out. I am attaching the paper work. If you I could forward it to my employer.   If you like I could sent this to Dr. Little but you wrote my other letter, so I went to you first.  If you have any questions feel free to contact me.    Thank you,  Jayy Jalloh

## 2024-02-20 NOTE — TELEPHONE ENCOUNTER
Patient of Dr. Little and Zonia Mo PA-C attached forms through their MyChart that need to be completed. Forms sent to the forms department via email on 02/20/24, as well. Please advise. Thank you!

## 2024-02-26 ENCOUNTER — TELEPHONE (OUTPATIENT)
Dept: ORTHOPEDICS CLINIC | Facility: CLINIC | Age: 54
End: 2024-02-26

## 2024-02-26 NOTE — TELEPHONE ENCOUNTER
Type of Leave: Continuous  Reason for Leave: achilles sx on 2/2/24  Start date of leave: 2/2/24-2/29/24 pending re-eval  How much time needed?:  2 weeks pending re-eval  Forms Due Date:asap  Was Fee and Turnaround info Given?: Yes

## 2024-02-27 NOTE — TELEPHONE ENCOUNTER
Dr. Little,     *The ACKNOWLEDGE button has been moved to the top right ribbon*    Please sign off on form if you agree to: Disab for sx of right achilles tendon repair from 2/2/24 to pending re-eval on 2/29/24.  (place your signature on the first page only)    -From your Inbasket, Highlight the patient and click Chart   -Double click the 2/26/24 Forms Completion telephone encounter  -Scroll down to the Media section   -Click the blue Hyperlink: Jhonatan, Dr. Little, 2/27/24    -Click Acknowledge located in the top right ribbon/menu   -Drag the mouse into the blank space of the document and a + sign will appear. Left click to   electronically sign the document.     Thank you,  Suki MCKEON

## 2024-02-29 ENCOUNTER — PATIENT MESSAGE (OUTPATIENT)
Dept: ORTHOPEDICS CLINIC | Facility: CLINIC | Age: 54
End: 2024-02-29

## 2024-02-29 ENCOUNTER — OFFICE VISIT (OUTPATIENT)
Dept: ORTHOPEDICS CLINIC | Facility: CLINIC | Age: 54
End: 2024-02-29
Payer: COMMERCIAL

## 2024-02-29 VITALS — HEIGHT: 73 IN | WEIGHT: 315 LBS | BODY MASS INDEX: 41.75 KG/M2

## 2024-02-29 DIAGNOSIS — Z48.89 AFTERCARE FOLLOWING SURGERY: Primary | ICD-10-CM

## 2024-02-29 DIAGNOSIS — S86.011D RUPTURE OF RIGHT ACHILLES TENDON, SUBSEQUENT ENCOUNTER: ICD-10-CM

## 2024-02-29 NOTE — PROGRESS NOTES
[facility] Post Op Progress Note    Date of Surgery: 02/02/2024      Subjective: Jayy Jalloh is a 53 year old male who is here for follow-up of his right Achilles.  He underwent right Achilles tendon repair by Dr. Little approximately 4 weeks ago.  He has tolerated recasting in a short leg cast to the right lower extremity with no issues.  He states overall pain is minimal.  No other complaints.      Objective: Exam of the right foot ankle reveals that the incision is well-healed.  He is able to wiggle his digits.  No calf pain upon palpation.  Sensation is present to light touch.      Assessment: Status post right Achilles tendon repair      Plan: He is 4 weeks post Achilles tendon repair.  We did cast him an additional 2 weeks due to his prior tendinosis that caused rupture.  We will initiate formal therapy for him and a PT prescription was given with protocol.  He is able to transition to a cam boot with heel wedges.  He will remain in the cam boot and weight-bear as tolerated with wedges for an additional month.  At week 8 weeks postop he is able to remove 1 heel wedge each week until he is plantigrade by 12 weeks.  He will follow-up with us in 6 weeks to see how he is progressing.  At that time he will have 2 heel wedges left.  At that time we will discuss return to work restrictions.  He does work downtown and has a difficult time with transportation as he is unable to fully weight-bear.  He will follow-up sooner with questions, concerns, or worsening symptoms.    A student was present during the visit after the patient's consent.    Zonia Mo, [facility], PA-C Orthopedic Surgery   [facility]  [address] [place] [id number]   t: [phone]  f: [phone]

## 2024-02-29 NOTE — TELEPHONE ENCOUNTER
From: Jayy Jalloh  To: Zonia Mo  Sent: 2/29/2024 12:55 PM CST  Subject: Question about the boot and sleeping     Dr. Amado's  Do I need to keep the boot o when I an sleeping?  Jayy Jalloh

## 2024-03-21 ENCOUNTER — MED REC SCAN ONLY (OUTPATIENT)
Dept: ORTHOPEDICS CLINIC | Facility: CLINIC | Age: 54
End: 2024-03-21

## 2024-04-11 ENCOUNTER — OFFICE VISIT (OUTPATIENT)
Dept: ORTHOPEDICS CLINIC | Facility: CLINIC | Age: 54
End: 2024-04-11
Payer: COMMERCIAL

## 2024-04-11 VITALS — BODY MASS INDEX: 41.75 KG/M2 | HEIGHT: 73 IN | WEIGHT: 315 LBS

## 2024-04-11 DIAGNOSIS — Z48.89 AFTERCARE FOLLOWING SURGERY: Primary | ICD-10-CM

## 2024-04-11 DIAGNOSIS — S86.011D RUPTURE OF RIGHT ACHILLES TENDON, SUBSEQUENT ENCOUNTER: ICD-10-CM

## 2024-04-11 PROCEDURE — 99024 POSTOP FOLLOW-UP VISIT: CPT | Performed by: ORTHOPAEDIC SURGERY

## 2024-04-11 NOTE — PROGRESS NOTES
[facility] New Patient Note    Chief Complaint   Patient presents with    Post-Op     RT ACHILLES REPAIR; SX: 02/02/24       HPI: The patient is a 53 year old male with history of type 1 diabetes who presents for postop follow-up after Achilles tendon repair on the right.  He tolerates physical therapy and is ambulating in his boot with 2 remaining wedges and a single crutch.  He and his wife relate that [facility] [facility] [place] but has been essentially working from home.  They request a work restriction note to be extended.  No new complications are reported postop.    Past Medical History:    Esophageal reflux    High blood pressure    High cholesterol    Morbid obesity with BMI of 45.0-49.9, adult (HCC)    Type 1 diabetes mellitus (HCC)    Visual impairment     Past Surgical History:   Procedure Laterality Date    Colonoscopy      Fracture surgery      Tonsillectomy      Upper gi endoscopy,exam       Current Outpatient Medications   Medication Sig Dispense Refill    aspirin 81 MG Oral Tab EC Take 1 tablet (81 mg total) by mouth daily.      montelukast 10 MG Oral Tab Take 1 tablet (10 mg total) by mouth nightly.      rosuvastatin 20 MG Oral Tab Take 1 tablet (20 mg total) by mouth nightly.      famotidine 40 MG Oral Tab Take 1 tablet (40 mg total) by mouth daily.      omeprazole 20 MG Oral Capsule Delayed Release Take 2 capsules (40 mg total) by mouth every morning before breakfast. EVERY DAY BEFORE DINNER      azelastine 0.1 % Nasal Solution 1 spray by Nasal route as needed for Rhinitis.      losartan 100 MG Oral Tab TK 1 T PO D  2    BASAGLAR KWIKPEN 100 UNIT/ML Subcutaneous Solution Pen-injector   2    Losartan Potassium-HCTZ 100-25 MG Oral Tab       Pantoprazole Sodium 40 MG Oral Tab EC TAKE 1 TABLET DAILY      Insulin Aspart (NOVOLOG SC) Inject into the skin.      hydrochlorothiazide 25 MG Oral Tab Take 1 tablet (25 mg total) by mouth daily.      VITAMIN E OR Take by  mouth.      B Complex Vitamins (VITAMIN B COMPLEX OR) Inject as directed.      Ascorbic Acid (PETE-C OR) Take by mouth.      VITAMIN E OR       HYDROcodone-acetaminophen 5-325 MG Oral Tab Take 1-2 tablets by mouth every 6 (six) hours as needed for Pain. (Patient not taking: Reported on 4/11/2024) 24 tablet 0    diclofenac 75 MG Oral Tab EC Take 1 tablet (75 mg total) by mouth as needed (NOT MORE THAN ONCE EVERY 12 HOURS). (Patient not taking: Reported on 4/11/2024)       No Known Allergies  History reviewed. No pertinent family history.  Social History     Occupational History    Not on file   Tobacco Use    Smoking status: Never    Smokeless tobacco: Never   Vaping Use    Vaping status: Never Used   Substance and Sexual Activity    Alcohol use: Never    Drug use: Never    Sexual activity: Not on file        ROS:  Complete ROS reviewed by me and non-contributory to the chief complaint except as mentioned above.    Physical Exam:    Ht 6' 1\" (1.854 m)   Wt (!) 352 lb (159.7 kg)   BMI 46.44 kg/m²   Cam boot is removed from the right foot and ankle.  Postsurgical incision is healing well.  There is a small healing blister without erythema or swelling at the calcaneal tuberosity which he states was from the boot.  Dorsiflexion is nearly 10 degrees of plantarflexion to 25 degrees.  In the prone position Isaacs test is normalized with minimal edema overlying the repair and moderate calf atrophy.  Distal neurovascular status is intact.    Assessment/Diagnoses:  Diagnoses and all orders for this visit:    Aftercare following surgery    Rupture of right Achilles tendon, subsequent encounter    Plan: Overall, the patient appears to be doing well just more than 2 months status post Achilles tendon repair.  Given his relative age, BMI and history of diabetes I would like to move slowly and continue weaning of the wedges over the next 3 weeks until the end of April.  He may begin sleeping out of the boot at this point  progression of his range of motion as tolerated including some light Thera-Band strengthening.  He should be able to drive an automobile by the end of the month and consider [place] the [place] [employer] [place].  Work note is provided today.  Continue with PT during the next 6 weeks and follow-up for reassessment thereafter.      Ester Little MD  [facility]      This document was partially prepared using Dragon Medical voice recognition software.

## 2024-04-29 ENCOUNTER — PATIENT MESSAGE (OUTPATIENT)
Dept: ORTHOPEDICS CLINIC | Facility: CLINIC | Age: 54
End: 2024-04-29

## 2024-04-30 NOTE — TELEPHONE ENCOUNTER
Per your last office note on 04/11/24:     \"He may begin sleeping out of the boot at this point progression of his range of motion as tolerated including some light Thera-Band strengthening.  He should be able to drive an automobile by the end of the month and consider commuting in the boot to and from the [employer] train.\"    Please advise if the patient is able to not wear the boot after taking out the last wedge this Friday or if they should keep wearing the boot (and if so the length of time). Thank you!

## 2024-04-30 NOTE — TELEPHONE ENCOUNTER
From: Jayy Jalloh  To: Ester Little  Sent: 4/29/2024 4:56 PM CDT  Subject: Question on the boot    Dr. Little,    On Friday I am to take-out my last wedge, will I need to wear the boot without any wedges for some time? If so, for how long?    Thank you,   Jayy Jalloh

## 2024-05-23 ENCOUNTER — OFFICE VISIT (OUTPATIENT)
Dept: ORTHOPEDICS CLINIC | Facility: CLINIC | Age: 54
End: 2024-05-23

## 2024-05-23 VITALS — WEIGHT: 315 LBS | BODY MASS INDEX: 41.75 KG/M2 | HEIGHT: 73 IN

## 2024-05-23 DIAGNOSIS — S86.011D RUPTURE OF RIGHT ACHILLES TENDON, SUBSEQUENT ENCOUNTER: ICD-10-CM

## 2024-05-23 DIAGNOSIS — Z09 FOLLOW-UP EXAMINATION, FOLLOWING OTHER SURGERY: Primary | ICD-10-CM

## 2024-05-23 PROCEDURE — 99213 OFFICE O/P EST LOW 20 MIN: CPT | Performed by: ORTHOPAEDIC SURGERY

## 2024-05-23 NOTE — PROGRESS NOTES
[facility] follow-up    Chief Complaint   Patient presents with    Post-Op     RT ACHILLES REPAIR;SX:02/02/24     HPI: The patient is a 53 year old male with history of type 1 diabetes who presents for postop follow-up after Achilles tendon repair on the right.  He tolerates physical therapy and is ambulating out of his boot at home with intermittent use when out and about in public environments.  He is no longer using wedge inserts and reports good tolerance of daily activities.  He inquires as to when it would be appropriate to begin his commutes via [employer] for work, which requires about 1/2 mile walk.    Past Medical History:    Esophageal reflux    High blood pressure    High cholesterol    Morbid obesity with BMI of 45.0-49.9, adult ([facility])    Type 1 diabetes mellitus (HCC)    Visual impairment     Past Surgical History:   Procedure Laterality Date    Colonoscopy      Fracture surgery      Tonsillectomy      Upper gi endoscopy,exam       Current Outpatient Medications   Medication Sig Dispense Refill    aspirin 81 MG Oral Tab EC Take 1 tablet (81 mg total) by mouth daily.      rosuvastatin 20 MG Oral Tab Take 1 tablet (20 mg total) by mouth nightly.      famotidine 40 MG Oral Tab Take 1 tablet (40 mg total) by mouth daily.      omeprazole 20 MG Oral Capsule Delayed Release Take 2 capsules (40 mg total) by mouth every morning before breakfast. EVERY DAY BEFORE DINNER      azelastine 0.1 % Nasal Solution 1 spray by Nasal route as needed for Rhinitis.      losartan 100 MG Oral Tab TK 1 T PO D  2    Pantoprazole Sodium 40 MG Oral Tab EC TAKE 1 TABLET DAILY      Insulin Aspart (NOVOLOG SC) Inject into the skin.      hydrochlorothiazide 25 MG Oral Tab Take 1 tablet (25 mg total) by mouth daily.      VITAMIN E OR Take by mouth.      B Complex Vitamins (VITAMIN B COMPLEX OR) Inject as directed.      Ascorbic Acid (PETE-C OR) Take by mouth.      VITAMIN E OR       diclofenac 75 MG Oral Tab EC Take 1  tablet (75 mg total) by mouth as needed (NOT MORE THAN ONCE EVERY 12 HOURS). (Patient not taking: Reported on 4/11/2024)       No Known Allergies  History reviewed. No pertinent family history.  Social History     Occupational History    Not on file   Tobacco Use    Smoking status: Never    Smokeless tobacco: Never   Vaping Use    Vaping status: Never Used   Substance and Sexual Activity    Alcohol use: Never    Drug use: Never    Sexual activity: Not on file        ROS:  Complete ROS reviewed by me and non-contributory to the chief complaint except as mentioned above.    Physical Exam:    Ht 6' 1\" (1.854 m)   Wt (!) 352 lb (159.7 kg)   BMI 46.44 kg/m²   Cam boot is removed from the right foot and ankle.  Gait is nonantalgic.  He can perform a double leg toe raise symmetrically.  Postsurgical incision is healing well.  He has symmetrical resting plantarflexion in the prone position with reestablished Isaacs test.  Calf is completely nontender with minimal if any residual edema through the zone of injury and surgery.  Distal neurovascular status is intact.    Assessment/Diagnoses:  Diagnoses and all orders for this visit:    Follow-up examination, following other surgery    Rupture of right Achilles tendon, subsequent encounter    BMI 45.0-49.9, adult (HCC)      Plan: Overall, the patient is doing very well approximately 4 months status post right Achilles tendon repair.  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  It would be appropriate for him to begin in mid June after the conclusion of formal therapy.  The importance of self-directed home exercise was emphasized to regain his baseline level of endurance.  All questions were answered and he verbalized understanding and appreciation.  If he has any new or ongoing symptoms follow-up reassessment is advised.    Ester Little,  MD [facility]      This document was partially prepared using Dragon Medical voice recognition software.

## 2024-07-16 ENCOUNTER — MED REC SCAN ONLY (OUTPATIENT)
Dept: ORTHOPEDICS CLINIC | Facility: CLINIC | Age: 54
End: 2024-07-16

## (undated) DEVICE — GOWN SURG AERO CHROME XXL

## (undated) DEVICE — PACK PBDS LOWER EXTREMITY

## (undated) DEVICE — Device

## (undated) DEVICE — SUTURE VCRL SZ 3-0 L27IN ABSRB UD L19MM FS-2

## (undated) DEVICE — ADHESIVE SKIN TOP FOR WND CLSR DERMBND ADV

## (undated) DEVICE — GLOVE SUR 8 PROTEXIS PI PIP NEU-THERA COAT

## (undated) DEVICE — SRG GLV PROTEXIS BLUE 6.5

## (undated) DEVICE — GLOVE SUR 8 PROTEXIS PIP CRM PWD F

## (undated) DEVICE — NEEDLE ANCHOR 1827-4D

## (undated) DEVICE — SUTURE ARTHSCP SUTURETAPE 1.3 W/TPR NDL

## (undated) DEVICE — PADDING CAST W6INXL4YD 100% COT ABSRB HIGHLY

## (undated) DEVICE — SUTURE VICRYL 3-0 SH

## (undated) DEVICE — SLEEVE COMPR M KNEE LEN SGL USE KENDALL SCD

## (undated) DEVICE — SOLUTION IRRIG 1000ML 0.9% NACL USP BTL

## (undated) DEVICE — DRESSING WET L16XW3IN OIL EMUL N ADH CURAD

## (undated) DEVICE — PAD ABD W7.5XL8IN GEN USE NONADHESIVE EXTRA

## (undated) DEVICE — BANDAGE COHESIVE W4INXL5YD TAN E POR SLF ADH

## (undated) NOTE — LETTER
Date: 4/11/2024    Patient Name: Jayy Jalloh          To Whom it may concern:        The above patient was seen at [facility] for treatment of a medical condition, and is currently under my medical care.    The patient should work from home only. The patient is to continue their physical therapy, and will follow-up in 6 weeks for re-evaluation. At that time, the patient will receive updated work restrictions.    If you have any questions or concerns, please contact our office.        Sincerely,    Ester Little MD

## (undated) NOTE — LETTER
Date: 5/23/2024    Patient Name: Jayy Jalloh          To Whom it may concern:      The above patient was seen at [facility] for treatment of a medical condition and is currently under my medical care.      The patient may return to work starting June 17, 2024 with no restrictions. The patient is able to resume his commute starting June 17, 2024 as well. He will then follow up at his next appointment and be re-evaluated at that time. If you have any questions please contact our office at [phone].        Sincerely,    Ester Little MD

## (undated) NOTE — LETTER
[facility] Testing Department  Phone: [phone]  OUTSIDE TESTING RESULT REQUEST      TO:   DR. NYDIA SHEPARD Today's Date: 24    FAX #: [phone]     IMPORTANT: FOR YOUR IMMEDIATE ATTENTION  Please FAX all test results listed below to: [phone]     Testing already done on or about: 2024     * * * * If testing is NOT complete, arrange with patient A.S.A.P. * * * *      Patient Name: Jayy Jalloh  Surgery Date: 2024    CSN: [id number]  Medical Record: [id number]   : 1970 - A: 53 y      Sex: male  Surgeon(s):  Ester Little MD  Procedure: RIGHT ACHILLES TENDON REPAIR  Anesthesia Type: General     Surgeon: Ester Little MD       The following Testing and Time Line are REQUIRED PER ANESTHESIA     EKG READ AND SIGNED WITHIN   90 days      Thank You,   Sent by: LANA AGUILAR RN      CONFIDENTIALITY NOTICE  This transmission is intended only for the use of the individual or entity to which it is addressed and may contain information that is privileged and confidential.  If the reader of this message is not the intended recipient, you are hereby notified that any disclosure, distribution, or copying of this information is strictly prohibited.  If you have received this transmission in error, please notify us immediately by telephone, and return the original documents to us at the address listed above.

## (undated) NOTE — LETTER
Date: 2/29/2024    Patient Name: Jayy Jalloh          To Whom it may concern:      The above patient was seen at [facility] for treatment of a medical condition.        This patient should be excused from attending work for the next 6 weeks. He will then follow up on his next appointment and  be reevaluated at that time.            Sincerely,    Zonia Mo PA-C

## (undated) NOTE — LETTER
04/12/2024    Patient Name: Jayy Jalloh          To Whom it may concern:        The above patient was seen at [facility] for treatment of a medical condition, and is currently under my medical care.    The patient should work from home only, starting today, April 12, 2024.The patient is to continue their physical therapy, and will follow-up in 6 weeks for re-evaluation. At that time, the patient will receive updated work restrictions.    If you have any questions or concerns, please contact our office.        Sincerely,    Ester Little MD

## (undated) NOTE — LETTER
Date: 2/15/2024    Patient Name: Jayy Jalloh          To Whom it may concern:    The above patient was seen at the [facility] for treatment of a medical condition.      This patient should be excused from attending work for the next two weeks. He will then follow up on his next appointment and be reevaluated at that time.        Sincerely,    Zonia Mo PA-C

## (undated) NOTE — LETTER
24    Orthopedic Surgery   Pre-Operative Clearance Request    Patient Name:   Jayy Jalloh             :   1970    Surgeon: Dr. Little             Date of Surgery: 24    Surgical Procedure: RIGHT ACHILLES TENDON REPAIR      Please complete all of the following 2-3 weeks PRIOR TO your scheduled surgery to avoid potential cancellation.     [x]  History and Physical        [x]  Medical  Clearance                     Required pre-op testing to be ordered: N/A                          **Please fax test results, H&P, and clearance to [phone] and to P.A.T at [phone]**